# Patient Record
Sex: MALE | Race: OTHER | HISPANIC OR LATINO | Employment: FULL TIME | URBAN - METROPOLITAN AREA
[De-identification: names, ages, dates, MRNs, and addresses within clinical notes are randomized per-mention and may not be internally consistent; named-entity substitution may affect disease eponyms.]

---

## 2018-10-12 ENCOUNTER — HOSPITAL ENCOUNTER (EMERGENCY)
Facility: HOSPITAL | Age: 48
Discharge: HOME | End: 2018-10-12
Attending: EMERGENCY MEDICINE

## 2018-10-12 VITALS
WEIGHT: 166.67 LBS | DIASTOLIC BLOOD PRESSURE: 86 MMHG | OXYGEN SATURATION: 98 % | BODY MASS INDEX: 27.77 KG/M2 | TEMPERATURE: 98.4 F | SYSTOLIC BLOOD PRESSURE: 142 MMHG | RESPIRATION RATE: 20 BRPM | HEART RATE: 62 BPM | HEIGHT: 65 IN

## 2018-10-12 DIAGNOSIS — L03.012 PARONYCHIA OF FINGER OF LEFT HAND: Primary | ICD-10-CM

## 2018-10-12 PROCEDURE — 99283 EMERGENCY DEPT VISIT LOW MDM: CPT

## 2018-10-12 RX ORDER — CEPHALEXIN 500 MG/1
500 CAPSULE ORAL 3 TIMES DAILY
Qty: 30 CAPSULE | Refills: 0 | Status: SHIPPED | OUTPATIENT
Start: 2018-10-12 | End: 2018-10-22

## 2018-10-12 RX ADMIN — Medication 1 APPLICATION: at 11:23

## 2018-10-12 NOTE — DISCHARGE INSTRUCTIONS
Paroniquia   LO QUE NECESITA SABER:   Paroniquia es billy infección e inflamación de whyte pliegue ungueal provocada por bacteria o un hongo  El pliegue ungueal es la piel alrededor de [place]  Paroniquia puede aparecer súbitamente y durar por 6 semanas o más  Es probable que usted presente la paroniquia en 1 o más dedos de la pie o la [place]  INSTRUCCIONES SOBRE EL [facility] HOSPITALARIA:   Medicamentos:   · La vacuna Td  es billy vacuna de refuerzo para ayudar a prevenir la difteria y el tétano  El refuerzo Td se puede administrar a adolescentes y adultos cada 8 años o para ciertas heridas y lesiones  · Antibióticos:  Tricia medicamento va a ayudar a combatir o prevenir billy infección  Puede administrarse randell billy píldora, crema o pomada  · Esteroides:  Tricia medicamento facilitará la reducción de whyte inflamación  Puede administrarse randell billy píldora, crema o pomada  · Medicamento antimicótico:  Tricia medicamento facilita la eliminación del hongo que posiblemente haya sido la causa de whyte infección  Puede administrarse randell crema o ungüento  · AINEs (analgésicos antiinflamatorios no esteroides):  Estos medicamentos calman el dolor y la hinchazón  Los AINEs se pueden obtener sin receta médica  Consulte con whyte médico cuál medicamento es el adecuado para usted  Pregunte cuánto nini y cuándo  Tómelos randell se le indique  Cuando no se fermin de la [employer], los medicamentos antiinflamatorios no esteroides pueden causar sangrado estomacal y problemas renales  · [place] jon medicamentos randell se le haya indicado  Consulte con whyte médico si usted jeffrey que whyte medicamento no le está ayudando o si presenta efectos secundarios  Infórmele si es alérgico a algún medicamento  Mantenga billy lista actualizada de los [place], las vitaminas y los productos herbales que arron  Incluya los siguientes datos de los medicamentos: cantidad, frecuencia y motivo de administración   Traiga con usted la lista o los envases de la píldoras a jon citas de seguimiento  Lleve la lista de los medicamentos con usted en elver de billy emergencia  Acuda a jon consultas de control con whyte médico según le indicaron  Anote jon preguntas para que se acuerde de hacerlas quintin jon visitas  Cuidados personales:   · Remoje whyte uña:  Sumerja whyte uña en billy mezcla de partes iguales de vinagre y agua 3 o 4 veces cada día  [place] ayudará a disminuir la inflamación  · Aplíquese billy compresa tibia:  Sumerja un paño pequeño en agua tibia y colóquelo sobre whyte uña  [place] ayudará a disminuir la inflamación  · Eleve:  Eleve whyte uña por encima del nivel de whyte corazón lo más frecuentemente  [place] va a disminuir inflamación y el dolor  Coloque whyte uña sobre almohadas o cobijas para [place] y cómoda  · Utilice crema:  Aplique crema después de lavarse las ever  [place] prevendrá sequedad en las ever  Prevención de paroniquia:   · Evite sustancias químicas y alergenos que podrían lesionar whyte piel y [place]  [place] incluye los [place], detergentes para la ropa y productos de [place]  · Mantenga jon uñas limpias y secas  No remoje jon uñas en agua  Utilice guantes de goma forrados de algodón o 2 pares de guantes de goma si usted trabaja con alimentos o agua  Los guantes ayudarán a proteger whyte pliegue ungueal     · Mantenga jon uñas cortas  No se [employer], no se quite los padrastros, no se chupe los dedos, ni use uñas artificiales  Lleve jon propios instrumentos de uñas cuando usted va al salón de belleza  Pregúntele a whyte Reyne Rave vitaminas y minerales son adecuados para usted  · Whyte uña se suelta, se deforma o se  por completo  · Usted tiene un absceso jose en la [place]  · Usted tiene preguntas o inquietudes acerca de whyte condición o cuidado  Regrese a la teri de emergencias si:   · Usted tiene dolor severo en whyte uña  · La inflamación se extiende a whyte mano o brazo    ©  2600 [employer] [employer] Information is for End User's use only and may not be sold, redistributed or otherwise used for commercial purposes  All illustrations and images included in CareNotes® are the copyrighted property of A D A M , Inc  or Roman Franco  Esta información es sólo para uso en educación  Whyte intención no es darle un consejo médico sobre enfermedades o tratamientos  Colsulte con whyte Debra Seals farmacéutico antes de seguir cualquier régimen médico para saber si es seguro y efectivo para usted

## 2018-10-12 NOTE — ED PROVIDER NOTES
History  Chief Complaint   Patient presents with    Finger Swelling     pt presents ambulatory with c/o L thumb swelling x2 weeks     56 y o  male with no significant past medical history presents to ED with chief complaint of left thumb swelling  Onset of symptoms reported as 2 weeks ago  Location of symptoms reported as left thumb  Quality is reported as localized swelling  Severity is reported as moderate  Associated symptoms: denies fevers or chills, denies paralysis, paraesthesia or weakness  Denies hand swelling  Modifying factors: local pressure/use exacerbates pain  Context: patient denies any acute injury or trauma to the area  He reports swelling to cuticle of left thumb x 2 weeks  No prior similar episodes in the past   Patient is right hand dominant  Reviewed past medical history and visits via EPIC:  No prior visits to this ED  History provided by:  Patient   used: No        None       History reviewed  No pertinent past medical history  Past Surgical History:   Procedure Laterality Date    LEG SURGERY Left        History reviewed  No pertinent family history  I have reviewed and agree with the history as documented  Social History   Substance Use Topics    Smoking status: Never Smoker    Smokeless tobacco: Not on file    Alcohol use No        Review of Systems   Constitutional: Negative for activity change, appetite change, chills, diaphoresis, fatigue, fever and unexpected weight change  HENT: Negative for congestion, dental problem, drooling, ear discharge, ear pain, facial swelling, hearing loss, mouth sores, nosebleeds, postnasal drip, rhinorrhea, sinus pain, sinus pressure, sneezing, sore throat, tinnitus, trouble swallowing and voice change  Eyes: Negative for photophobia, pain, discharge, redness, itching and visual disturbance     Respiratory: Negative for apnea, cough, choking, chest tightness, shortness of breath, wheezing and stridor  Cardiovascular: Negative for chest pain, palpitations and leg swelling  Gastrointestinal: Negative for abdominal distention, abdominal pain, anal bleeding, blood in stool, constipation, diarrhea, nausea and vomiting  Endocrine: Negative for cold intolerance, heat intolerance, polydipsia, polyphagia and polyuria  Genitourinary: Negative for difficulty urinating, dysuria, flank pain, frequency, hematuria and urgency  Musculoskeletal: Positive for joint swelling  Negative for arthralgias, back pain, gait problem, myalgias, neck pain and neck stiffness  Skin: Negative for color change, pallor, rash and wound  Allergic/Immunologic: Negative for environmental allergies, food allergies and immunocompromised state  Neurological: Negative for dizziness, tremors, seizures, syncope, facial asymmetry, speech difficulty, weakness, light-headedness, numbness and headaches  Hematological: Negative for adenopathy  Does not bruise/bleed easily  Psychiatric/Behavioral: Negative for agitation, confusion and hallucinations  The patient is not nervous/anxious  All other systems reviewed and are negative  Physical Exam  Physical Exam   Constitutional: He is oriented to person, place, and time  He appears well-developed and well-nourished  No distress  /86 (BP Location: Left arm)   Pulse 62   Temp 98 4 °F (36 9 °C) (Oral)   Resp 20   Ht 5' 4 5" (1 638 m)   Wt 75 6 kg (166 lb 10 7 oz)   SpO2 98%   BMI 28 17 kg/m²    HENT:   Head: Normocephalic and atraumatic  Right Ear: External ear normal    Left Ear: External ear normal    Nose: Nose normal    Mouth/Throat: Oropharynx is clear and moist  No oropharyngeal exudate  Eyes: Pupils are equal, round, and reactive to light  Conjunctivae and EOM are normal  Right eye exhibits no discharge  Left eye exhibits no discharge  No scleral icterus  Neck: Normal range of motion  Neck supple  No tracheal deviation present  No thyromegaly present  Cardiovascular: Normal rate, regular rhythm and intact distal pulses  Pulmonary/Chest: Effort normal and breath sounds normal  No stridor  No respiratory distress  He has no wheezes  He has no rales  He exhibits no tenderness  Abdominal: Soft  Bowel sounds are normal  He exhibits no distension and no mass  There is no tenderness  There is no rebound and no guarding  Musculoskeletal: Normal range of motion  He exhibits edema and tenderness  He exhibits no deformity  There is a localized area of swelling, erythema and fluctuance present to cuticle of left thumb  Appears consistent with paronychia  No linear proximal streaking or erythema  There is FROM to Left thumb IP and MCP joints and all remaining fingers  Lymphadenopathy:     He has no cervical adenopathy  Neurological: He is alert and oriented to person, place, and time  He displays normal reflexes  No cranial nerve deficit or sensory deficit  He exhibits normal muscle tone  Coordination normal    Skin: Skin is warm and dry  Capillary refill takes less than 2 seconds  No rash noted  He is not diaphoretic  There is erythema  No pallor  Localized erythema present to cuticle of left thumb  Psychiatric: He has a normal mood and affect  His behavior is normal  Judgment and thought content normal    Nursing note and vitals reviewed        Vital Signs  ED Triage Vitals   Temperature Pulse Respirations Blood Pressure SpO2   10/12/18 1026 10/12/18 1029 10/12/18 1029 10/12/18 1029 10/12/18 1029   98 4 °F (36 9 °C) 62 20 142/86 98 %      Temp Source Heart Rate Source Patient Position - Orthostatic VS BP Location FiO2 (%)   10/12/18 1026 10/12/18 1029 10/12/18 1029 10/12/18 1029 --   Oral Monitor Sitting Left arm       Pain Score       10/12/18 1029       No Pain           Vitals:    10/12/18 1029   BP: 142/86   Pulse: 62   Patient Position - Orthostatic VS: Sitting       Visual Acuity      ED Medications  Medications   LET gel 1 application (1 application Topical Given 10/12/18 1123)       Diagnostic Studies  Results Reviewed     None                 No orders to display              Procedures  Incision/Drainage  Date/Time: 10/12/2018 12:23 PM  Performed by: Larissa Poole  Authorized by: Larissa Poole     Patient location:  ED  Other Assisting Provider: No    Consent:     Consent obtained:  Verbal    Consent given by:  Patient    Risks discussed:  Bleeding, damage to other organs, infection, incomplete drainage and pain    Alternatives discussed:  No treatment  Universal protocol:     Patient identity confirmed:  Verbally with patient  Location:     Type:  Abscess    Location:  Upper extremity    Upper extremity location:  L thumb  Pre-procedure details:     Skin preparation:  Hibiclens  Anesthesia (see MAR for exact dosages): Anesthesia method:  Topical application    Topical anesthetic:  LET  Procedure details:     Complexity:  Simple    Needle aspiration: no      Incision types:  Stab incision    Scalpel blade:  11    Approach:  Puncture    Incision depth:  Skin    Wound management:  Probed and deloculated    Irrigation with saline:  Yes    Hemostat:  No    Drainage:  Bloody    Drainage amount:  Scant    Wound treatment:  Wound left open    Packing materials:  None  Post-procedure details:     Patient tolerance of procedure: Tolerated well, no immediate complications    Complication (if applicable):  None  Comments:      Distal neurovascular intact to left thumb on testing after drainage  Band aid placed over drainage site  Phone Contacts  ED Phone Contact    ED Course                               MDM  Number of Diagnoses or Management Options  Paronychia of finger of left hand: new and requires workup  Diagnosis management comments: ddx includes but is not limited to paronychia, felon, abscess, cellulitis, tenosynovitis  Plan incision and drainage        Discussed with patient care of paronychia - will treat with antibiotic coverage after incision and drainage in ED  Epsom salt soaks daily neosporin/dressing to thumb  Follow up with pcp and hand surgeon in 2-3 days for recheck and further evaluation of paronychia  Reviewed reasons to return to ed  Patient verbalized understanding of diagnosis and agreement with discharge plan of care as well as understanding of reasons to return to ed  Amount and/or Complexity of Data Reviewed  Review and summarize past medical records: yes    Patient Progress  Patient progress: stable    CritCare Time    Disposition  Final diagnoses:   Paronychia of finger of left hand     Time reflects when diagnosis was documented in both MDM as applicable and the Disposition within this note     Time User Action Codes Description Comment    10/12/2018 12:21 PM Lloyd Charlton Add [[id number]] Paronychia of finger of left hand       ED Disposition     ED Disposition Condition Comment    Discharge  [facility] discharge to home/self care      Condition at discharge: Stable        Follow-up Information     Follow up With Specialties Details Why Contact Info Additional Information    Abundio Khalil MD Orthopedic Surgery, Orthopedics Call in 1 day for further evaluation of symptoms [address] [facility] [address]  [facility] [address] [facility] 5D Emergency Department Emergency Medicine Go to If symptoms worsen [address] [facility] [address]  [phone] [place] ED, [address] [facility], [address] [facility], [address] Komal Nunn MD Family Medicine Call in 1 day for further evaluation of symptoms [address] [facility] [address]  [address]  [phone]             Discharge Medication List as of 10/12/2018 12:22 PM      START taking these medications    Details   cephalexin (KEFLEX) 500 mg capsule Take 1 capsule (500 mg total) by mouth 3 (three) times a day for 10 days, Starting Fri 10/12/2018, Until Mon 10/22/2018, Print           No discharge procedures on file      ED Provider  Electronically Signed by           Lien Castellon PA-C  10/12/18 7499

## 2023-10-10 ENCOUNTER — HOSPITAL ENCOUNTER (EMERGENCY)
Facility: HOSPITAL | Age: 53
Discharge: HOME | End: 2023-10-10
Attending: STUDENT IN AN ORGANIZED HEALTH CARE EDUCATION/TRAINING PROGRAM

## 2023-10-10 ENCOUNTER — APPOINTMENT (EMERGENCY)
Dept: RADIOLOGY | Facility: HOSPITAL | Age: 53
End: 2023-10-10

## 2023-10-10 VITALS
SYSTOLIC BLOOD PRESSURE: 188 MMHG | HEART RATE: 72 BPM | HEIGHT: 64 IN | WEIGHT: 166 LBS | TEMPERATURE: 98 F | DIASTOLIC BLOOD PRESSURE: 93 MMHG | BODY MASS INDEX: 28.34 KG/M2 | OXYGEN SATURATION: 98 % | RESPIRATION RATE: 18 BRPM

## 2023-10-10 DIAGNOSIS — L03.011 PARONYCHIA OF RIGHT THUMB: Primary | ICD-10-CM

## 2023-10-10 PROCEDURE — 73140 X-RAY EXAM OF FINGER(S): CPT

## 2023-10-10 PROCEDURE — 99283 EMERGENCY DEPT VISIT LOW MDM: CPT

## 2023-10-11 NOTE — ED PROVIDER NOTES
History  Chief Complaint   Patient presents with   • Finger Swelling     Significant swelling to right thumb for 4 days, no known injury, cap refill is 2 seconds to affected thumb. Patient reports pain and numbness to the tip of thumb. HPI     Patient is a 52-year-old male presenting emerged department with right thumb pain. Patient does not remember note any recent falls injury or trauma to the finger. Is been swollen for about the last 4 days. Notes localized discomfort. Able to move hand in all directions. Patient has had similar presentation in the past with a paronychia. Right hand dominant. Patient denies any fevers, chills, nausea or vomiting. Review of systems otherwise negative. No pertinent past medical history. None       History reviewed. No pertinent past medical history. Past Surgical History:   Procedure Laterality Date   • LEG SURGERY Left        History reviewed. No pertinent family history. I have reviewed and agree with the history as documented. E-Cigarette/Vaping     E-Cigarette/Vaping Substances     Social History     Tobacco Use   • Smoking status: Never   Substance Use Topics   • Alcohol use: No       Review of Systems   Constitutional: Negative for chills and fever. HENT: Negative for ear pain and sore throat. Eyes: Negative for pain and visual disturbance. Respiratory: Negative for cough and shortness of breath. Cardiovascular: Negative for chest pain and palpitations. Gastrointestinal: Negative for abdominal pain and vomiting. Genitourinary: Negative for dysuria and hematuria. Musculoskeletal: Negative for arthralgias and back pain. Right thumb pain   Skin: Negative for color change and rash. Neurological: Negative for seizures and syncope. All other systems reviewed and are negative. Physical Exam  Physical Exam  Vitals and nursing note reviewed. Constitutional:       General: He is not in acute distress.      Appearance: He is well-developed. HENT:      Head: Normocephalic and atraumatic. Eyes:      Conjunctiva/sclera: Conjunctivae normal.   Cardiovascular:      Rate and Rhythm: Normal rate and regular rhythm. Heart sounds: No murmur heard. Pulmonary:      Effort: Pulmonary effort is normal. No respiratory distress. Breath sounds: Normal breath sounds. Abdominal:      Palpations: Abdomen is soft. Tenderness: There is no abdominal tenderness. Musculoskeletal:         General: Swelling and tenderness present. Cervical back: Neck supple. Comments: Right thumb pain with Paronychia appearance. RUE neurovascularly intact. Skin:     General: Skin is warm and dry. Capillary Refill: Capillary refill takes less than 2 seconds. Neurological:      Mental Status: He is alert. Psychiatric:         Mood and Affect: Mood normal.         Vital Signs  ED Triage Vitals [10/10/23 1836]   Temperature Pulse Respirations Blood Pressure SpO2   98 °F (36.7 °C) 72 18 (!) 188/93 98 %      Temp Source Heart Rate Source Patient Position - Orthostatic VS BP Location FiO2 (%)   Tympanic Monitor Sitting Left arm --      Pain Score       --           Vitals:    10/10/23 1836   BP: (!) 188/93   Pulse: 72   Patient Position - Orthostatic VS: Sitting         Visual Acuity      ED Medications  Medications - No data to display    Diagnostic Studies  Results Reviewed     None                 XR thumb 2 views RIGHT    (Results Pending)              Procedures  Procedures         ED Course                                             Medical Decision Making  Differential: foreign body, paronychia, distal phalanx fracture    Patient is a well-appearing 79-year-old male presenting no acute respiratory distress and vital signs overall unremarkable. Patient's right thumb appears to have a paronychia with purulent appearance near the nailbed.   Patient had notable pain and tenderness on the right thumb but was neurovascularly intact. Communication was performed with the tablet at bedside as he is [place]-speaking. Xray non acute, no signs of foreign body    Per nursing staff patient need to get something from his car and never came back. Left before treatment complete. Amount and/or Complexity of Data Reviewed  Radiology: ordered and independent interpretation performed. Disposition  Final diagnoses:   None     ED Disposition     ED Disposition   Left from Room after Provider Exam    Condition   --    Date/Time   Tue Oct 10, 2023  8:41 PM    Comment   --         Follow-up Information    None         There are no discharge medications for this patient. No discharge procedures on file.     PDMP Review     None          ED Provider  Electronically Signed by           Mumtaz Morgan DO  10/11/23 0129

## 2023-10-11 NOTE — ED NOTES
Patient states needing to go out to the car for water at this time. RN offered water at the time, and patient refused. RN explained that if patient leaves the department, he can't just come back in. Patient stated ok and proceeded to walk out at this time. Provider made aware.       Mandi Norwood RN  10/10/23 2042